# Patient Record
Sex: FEMALE | Race: WHITE | NOT HISPANIC OR LATINO | Employment: FULL TIME | URBAN - METROPOLITAN AREA
[De-identification: names, ages, dates, MRNs, and addresses within clinical notes are randomized per-mention and may not be internally consistent; named-entity substitution may affect disease eponyms.]

---
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
## 2024-02-21 ENCOUNTER — CLINICAL SUPPORT (OUTPATIENT)
Dept: UROLOGY | Facility: CLINIC | Age: 19
End: 2024-02-21
Payer: COMMERCIAL

## 2024-02-21 ENCOUNTER — TELEPHONE (OUTPATIENT)
Dept: UROLOGY | Facility: CLINIC | Age: 19
End: 2024-02-21

## 2024-02-21 DIAGNOSIS — N39.0 FREQUENT UTI: Primary | ICD-10-CM

## 2024-02-21 LAB
BILIRUBIN, UA POC OHS: NEGATIVE
BLOOD, UA POC OHS: ABNORMAL
CLARITY, UA POC OHS: CLEAR
COLOR, UA POC OHS: YELLOW
GLUCOSE, UA POC OHS: NEGATIVE
KETONES, UA POC OHS: NEGATIVE
LEUKOCYTES, UA POC OHS: ABNORMAL
NITRITE, UA POC OHS: NEGATIVE
PH, UA POC OHS: 6.5
PROTEIN, UA POC OHS: 100
SPECIFIC GRAVITY, UA POC OHS: 1.02
UROBILINOGEN, UA POC OHS: 0.2

## 2024-02-21 PROCEDURE — 81003 URINALYSIS AUTO W/O SCOPE: CPT | Mod: QW,S$GLB,, | Performed by: NURSE PRACTITIONER

## 2024-02-21 NOTE — TELEPHONE ENCOUNTER
Pt calling to ask for medication for UTI symptoms. Pt states passing blood clots, stabbing pain in lower stomach, waking up all night in pain and urinating, and horrible back pain. Did let pt know all providers were gone for the afternoon but I was going to send Reza Ferguson NP a message and he would see it when he got here in the morning.

## 2024-02-21 NOTE — TELEPHONE ENCOUNTER
----- Message from Ratna Bradley sent at 2/21/2024  4:12 PM CST -----  Contact: Patient  Patient called to consult with nurse or staff regarding her visit today. She states she forgot to mention that she is passing clots and wanted to see if medication can be called in for her today. She would like this sent to   [facility] [address] [place] [address] - [address] [facility]  [address] [facility] [address]  Phone: [phone] Fax: [phone]  Patient would like a call back and can be reached at [phone]. Thanks/

## 2024-02-22 ENCOUNTER — TELEPHONE (OUTPATIENT)
Dept: UROLOGY | Facility: CLINIC | Age: 19
End: 2024-02-22
Payer: COMMERCIAL

## 2024-02-22 NOTE — TELEPHONE ENCOUNTER
Spoke with pt regarding results. Informed that they can take 24-48 hrs and that a message was sent regarding her request for medication. Pt verbalized understanding.

## 2024-02-24 LAB — URINE CULTURE, ROUTINE: NORMAL

## 2024-02-26 ENCOUNTER — TELEPHONE (OUTPATIENT)
Dept: UROLOGY | Facility: CLINIC | Age: 19
End: 2024-02-26
Payer: COMMERCIAL

## 2024-02-26 RX ORDER — NITROFURANTOIN 25; 75 MG/1; MG/1
100 CAPSULE ORAL 2 TIMES DAILY
Qty: 14 CAPSULE | Refills: 0 | Status: SHIPPED | OUTPATIENT
Start: 2024-02-26 | End: 2024-03-04

## 2024-02-26 NOTE — TELEPHONE ENCOUNTER
Pt notified. Pt said she couldn't wait on results of culture, so she went to UC and another doctor and got medication. Pt said she is fine now.

## 2024-02-26 NOTE — TELEPHONE ENCOUNTER
----- Message from Reza Ferguson NP sent at 2/26/2024  4:18 PM CST -----  E-coli on urine culture.  Macrobid sent to pharmacy